# Patient Record
Sex: FEMALE | Race: WHITE | NOT HISPANIC OR LATINO | Employment: UNEMPLOYED | URBAN - METROPOLITAN AREA
[De-identification: names, ages, dates, MRNs, and addresses within clinical notes are randomized per-mention and may not be internally consistent; named-entity substitution may affect disease eponyms.]

---

## 2022-08-11 ENCOUNTER — TELEPHONE (OUTPATIENT)
Dept: NEUROLOGY | Facility: CLINIC | Age: 52
End: 2022-08-11
Payer: OTHER GOVERNMENT

## 2022-08-11 ENCOUNTER — TELEPHONE (OUTPATIENT)
Dept: GASTROENTEROLOGY | Facility: CLINIC | Age: 52
End: 2022-08-11
Payer: OTHER GOVERNMENT

## 2022-08-11 ENCOUNTER — TELEPHONE (OUTPATIENT)
Dept: NEUROSURGERY | Facility: CLINIC | Age: 52
End: 2022-08-11
Payer: OTHER GOVERNMENT

## 2022-08-11 NOTE — TELEPHONE ENCOUNTER
----- Message from Brenda Reid sent at 8/11/2022  2:06 PM CDT -----  Contact: self  Type: Sooner Appointment Request        Caller is requesting a sooner appointment. Caller declined first available appointment listed below. Caller will not accept being placed on the waitlist and is requesting a message be sent to doctor.        Name of Caller: patient  When is the first available appointment? N/a  Best Call Back Number: [phone] (home)   Additional Information: Pt called today has a referral from Dr. Hoffman and wants to know can she be seen seen to get her colonoscopy. Plz call pt back to get scheduled. Thanks.

## 2022-08-11 NOTE — TELEPHONE ENCOUNTER
Called patient to make her aware that Malcom Bar doesn't see this type of diagnosis.  The patient v/u and stated she's being pointed in the right direction and she's being scheduled to see Dr. Thayer in Neurosurgery.  In basket to Endocrinology was retracted.

## 2022-08-11 NOTE — TELEPHONE ENCOUNTER
Spoke with patient. She is going to bring discs and image reports to be uploaded to get scheduled with [facility]. Dx is pituitary tumor.

## 2022-08-11 NOTE — TELEPHONE ENCOUNTER
After checking with Malcom Bar MA to see if this is something Dr. Snider would see, his MA stated he doesn't see this and it would be Endocrinology.  In basket message sent to Dr. Sandifer, Juliette and NP Qiana Kidd with End[facility]inology Clinic staff to contact patient and schedule appointment.

## 2022-08-12 ENCOUNTER — TELEPHONE (OUTPATIENT)
Dept: GASTROENTEROLOGY | Facility: CLINIC | Age: 52
End: 2022-08-12
Payer: OTHER GOVERNMENT

## 2022-08-17 ENCOUNTER — PATIENT MESSAGE (OUTPATIENT)
Dept: GASTROENTEROLOGY | Facility: CLINIC | Age: 52
End: 2022-08-17
Payer: OTHER GOVERNMENT

## 2022-08-17 ENCOUNTER — TELEPHONE (OUTPATIENT)
Dept: GASTROENTEROLOGY | Facility: CLINIC | Age: 52
End: 2022-08-17
Payer: OTHER GOVERNMENT

## 2022-08-18 ENCOUNTER — PATIENT MESSAGE (OUTPATIENT)
Dept: GASTROENTEROLOGY | Facility: CLINIC | Age: 52
End: 2022-08-18
Payer: OTHER GOVERNMENT

## 2022-11-16 ENCOUNTER — PATIENT MESSAGE (OUTPATIENT)
Dept: GASTROENTEROLOGY | Facility: CLINIC | Age: 52
End: 2022-11-16
Payer: OTHER GOVERNMENT

## 2022-11-23 ENCOUNTER — ANESTHESIA (OUTPATIENT)
Dept: ENDOSCOPY | Facility: HOSPITAL | Age: 52
End: 2022-11-23
Payer: OTHER GOVERNMENT

## 2022-11-23 ENCOUNTER — ANESTHESIA EVENT (OUTPATIENT)
Dept: ENDOSCOPY | Facility: HOSPITAL | Age: 52
End: 2022-11-23
Payer: OTHER GOVERNMENT

## 2022-11-23 ENCOUNTER — HOSPITAL ENCOUNTER (OUTPATIENT)
Facility: HOSPITAL | Age: 52
Discharge: HOME | End: 2022-11-23
Attending: INTERNAL MEDICINE | Admitting: INTERNAL MEDICINE
Payer: OTHER GOVERNMENT

## 2022-11-23 DIAGNOSIS — Z12.11 COLON CANCER SCREENING: ICD-10-CM

## 2022-11-23 PROCEDURE — 63600175 PHARM REV CODE 636 W HCPCS: Mod: PO | Performed by: INTERNAL MEDICINE

## 2022-11-23 PROCEDURE — G0121 COLON CA SCRN NOT HI RSK IND: ICD-10-PCS | Mod: ,,, | Performed by: INTERNAL MEDICINE

## 2022-11-23 PROCEDURE — D9220A PRA ANESTHESIA: Mod: ANES,,, | Performed by: ANESTHESIOLOGY

## 2022-11-23 PROCEDURE — G0121 COLON CA SCRN NOT HI RSK IND: HCPCS | Mod: ,,, | Performed by: INTERNAL MEDICINE

## 2022-11-23 PROCEDURE — D9220A PRA ANESTHESIA: Mod: CRNA,,, | Performed by: NURSE ANESTHETIST, CERTIFIED REGISTERED

## 2022-11-23 PROCEDURE — 37000009 HC ANESTHESIA EA ADD 15 MINS: Mod: PO | Performed by: INTERNAL MEDICINE

## 2022-11-23 PROCEDURE — 00812 ANES LWR INTST SCR COLSC: CPT | Mod: PO | Performed by: INTERNAL MEDICINE

## 2022-11-23 PROCEDURE — 25000003 PHARM REV CODE 250: Mod: PO | Performed by: NURSE ANESTHETIST, CERTIFIED REGISTERED

## 2022-11-23 PROCEDURE — D9220A PRA ANESTHESIA: ICD-10-PCS | Mod: ANES,,, | Performed by: ANESTHESIOLOGY

## 2022-11-23 PROCEDURE — G0121 COLON CA SCRN NOT HI RSK IND: HCPCS | Mod: PO | Performed by: INTERNAL MEDICINE

## 2022-11-23 PROCEDURE — 37000008 HC ANESTHESIA 1ST 15 MINUTES: Mod: PO | Performed by: INTERNAL MEDICINE

## 2022-11-23 PROCEDURE — D9220A PRA ANESTHESIA: ICD-10-PCS | Mod: CRNA,,, | Performed by: NURSE ANESTHETIST, CERTIFIED REGISTERED

## 2022-11-23 PROCEDURE — 63600175 PHARM REV CODE 636 W HCPCS: Mod: PO | Performed by: NURSE ANESTHETIST, CERTIFIED REGISTERED

## 2022-11-23 RX ORDER — LIDOCAINE HYDROCHLORIDE 20 MG/ML
INJECTION INTRAVENOUS
Status: DISCONTINUED | OUTPATIENT
Start: 2022-11-23 | End: 2022-11-23

## 2022-11-23 RX ORDER — PROPOFOL 10 MG/ML
VIAL (ML) INTRAVENOUS
Status: DISCONTINUED | OUTPATIENT
Start: 2022-11-23 | End: 2022-11-23

## 2022-11-23 RX ORDER — SODIUM CHLORIDE, SODIUM LACTATE, POTASSIUM CHLORIDE, CALCIUM CHLORIDE 600; 310; 30; 20 MG/100ML; MG/100ML; MG/100ML; MG/100ML
INJECTION, SOLUTION INTRAVENOUS CONTINUOUS
Status: DISCONTINUED | OUTPATIENT
Start: 2022-11-23 | End: 2022-11-23 | Stop reason: HOSPADM

## 2022-11-23 RX ORDER — SODIUM CHLORIDE 0.9 % (FLUSH) 0.9 %
10 SYRINGE (ML) INJECTION
Status: DISCONTINUED | OUTPATIENT
Start: 2022-11-23 | End: 2022-11-23 | Stop reason: HOSPADM

## 2022-11-23 RX ADMIN — PROPOFOL 50 MG: 10 INJECTION, EMULSION INTRAVENOUS at 10:11

## 2022-11-23 RX ADMIN — SODIUM CHLORIDE, SODIUM LACTATE, POTASSIUM CHLORIDE, AND CALCIUM CHLORIDE: .6; .31; .03; .02 INJECTION, SOLUTION INTRAVENOUS at 10:11

## 2022-11-23 RX ADMIN — LIDOCAINE HYDROCHLORIDE 50 MG: 20 INJECTION INTRAVENOUS at 10:11

## 2022-11-23 RX ADMIN — PROPOFOL 100 MG: 10 INJECTION, EMULSION INTRAVENOUS at 10:11

## 2022-11-23 NOTE — PATIENT INSTRUCTIONS
Recovery After Procedural Sedation (Adult)   You have been given medicine by vein to make you sleep during your surgery. This may have included both a pain medicine and sleeping medicine. Most of the effects have worn off. But you may still have some drowsiness for the next 6 to 8 hours.  Home care  Follow these guidelines when you get home:  For the next 8 hours, you should be watched by a responsible adult. This person should make sure your condition is not getting worse.  Don't drink any alcohol for the next 24 hours.  Don't drive, operate dangerous machinery, or make important business or personal decisions during the next 24 hours.  To prevent injury or falls, use caution when standing and walking for at least 24 hours after your procedure.  Note: Your healthcare provider may tell you not to take any medicine by mouth for pain or sleep in the next 4 hours. These medicines may react with the medicines you were given in the hospital. This could cause a much stronger response than usual.  Follow-up care  Follow up with your healthcare provider if you are not alert and back to your usual level of activity within 12 hours.  When to seek medical advice  Call your healthcare provider right away if any of these occur:  Drowsiness gets worse  Weakness or dizziness gets worse  Repeated vomiting  You can't be awakened  Fever  New rash  [employer] last reviewed this educational content on 9/1/2019  © 6965-8841 [employer]. [address] [place] [address], [place] [address]. All rights reserved. This information is not intended as a substitute for professional medical care. Always follow your healthcare professional's instructions           PROBIOTICS:  Now that your colon is so cleaned out, now is a good time for a round of PROBIOTICS.  Eat a container of Greek Yogurt, such as OIKOS or CHOBANI,  Or Activia or Dannon    Greek Yogurt.    Or Take a similar Probiotic product such as Align or Culturelle or Yanelis-Q, every day  for a month.                  (The products listed are non-prescription, but you may need to ask the pharmacist for their location.)   Repeat this at least 5-6 times a year.         High-Fiber Diet  Fiber is in fruits, vegetables, cereals, and grains. Fiber passes through your body undigested. A high-fiber diet helps food move through your intestinal tract. The added bulk is helpful in preventing constipation. In people with diverticulosis, fiber helps clean out the pouches along the colon wall. It also prevents new pouches from forming. A high-fiber diet reduces the risk of colon cancer. It also lowers blood cholesterol and prevents high blood sugar in people with diabetes.    The fiber-rich foods listed below should be part of your diet. If you are not used to high-fiber foods, start with 1 or 2 foods from this list. Every 3 to 4 days add a new one to your diet. Do this until you are eating 4 high-fiber foods per day. This should give you 20 to 35 grams of fiber a day. It is also important to drink a lot of water when you are on this diet. You should have 6 to 8 glasses of water a day. Water makes the fiber swell and increases the benefit.  Foods high in dietary fiber  The following foods are high in dietary fiber:  Breads. Breads made with 100% whole-wheat flour; colton, wheat, or rye crackers; whole-grain tortillas, bran muffins.  Cereals. Whole-grain and bran cereals with bran (shredded wheat, wheat flakes, raisin bran, corn bran); oatmeal, rolled oats, granola, and brown rice.  Fruits. Fresh fruits and their edible skins (pears, prunes, raisins, berries, apples, and apricots); bananas, citrus fruit, mangoes, pineapple; and prune juice.  Nuts. Any nuts and seeds.  Vegetables. Best served raw or lightly cooked. All types, especially: green peas, celery, eggplant, potatoes, spinach, broccoli, Shaw[place] sprouts, winter squash, carrots, cauliflower, soybeans, lentils, and fresh and dried beans of all kinds.  Other.  Popcorn, any spices.  Date Last Reviewed: 8/1/2016  © 3517-4827 [employer], [employer]. [address] [place] [address], [place] [address]. All rights reserved. This information is not intended as a substitute for professional medical care. Always follow your healthcare professional's instructions.

## 2022-11-23 NOTE — BRIEF OP NOTE
Discharge Note  Short Stay      SUMMARY     Admit Date: 11/23/2022    Attending Physician: Don Frances Jr., MD     Discharge Physician: Don Frances Jr., MD    Discharge Date: 11/23/2022 11:16 AM    Final Diagnosis: Screening for colon cancer [Z12.11]    Impression:            - Redundant colon.                          - Tortuous colon.                          - Non-bleeding internal hemorrhoids.                          - The examination was otherwise normal.                          - The examined portion of the ileum was normal.                          - No specimens collected.   Recommendation:        - Discharge patient to home.                          - High fiber diet.                          - Use fiber, for example Citrucel, Fibercon,                          Konsyl or Metamucil.                          - Take a PROBIOTIC, such as a carton of GREEK                          YOGURT (Chobani or Oikos, or Activia or Dannon);                          or tablets of ALIGN or CULTURELLE or CHARAN-Q (all                          non-prescription), every day for a month.                          - Repeat colonoscopy in 8-10 years for screening                          purposes.                          - Continue present medications.                          - Patient has a contact number available for                          emergencies. The signs and symptoms of potential                          delayed complications were discussed with the                          patient. Return to normal activities tomorrow.                          Written discharge instructions were provided to                          the patient.                          - Return to normal activities tomorrow.   Don Frances MD   11/23/2022       Disposition: HOME OR SELF CARE    Patient Instructions:   Current Discharge Medication List        CONTINUE these medications which have NOT CHANGED    Details   aspirin  (ECOTRIN) 81 MG EC tablet Take 1 tablet by mouth once daily.      cholecalciferol, vitamin D3, (VITAMIN D3 ORAL) Take by mouth.      CMPD testosterone proprionate 2% in vanicream Apply topically. Apply 1 gram as directed 2-3 times weekly.      DILT- mg CDCR Take 180 mg by mouth once daily.      estradioL (ELESTRIN) 0.87 gram/actuation GlPm       fluticasone propion-salmeterol 115-21 mcg/dose (ADVAIR HFA) 115-21 mcg/actuation HFAA inhaler Inhale 2 puffs into the lungs every 12 (twelve) hours.  Qty: 36 g, Refills: 3    Associated Diagnoses: Post-viral cough syndrome      losartan (COZAAR) 100 MG tablet Take 100 mg by mouth once daily. Take 50mg am, 50mg pm      NP THYROID 15 mg Tab Take 15 mg by mouth every morning.      progesterone (PROMETRIUM) 100 MG capsule Take 100 mg by mouth once daily.             Discharge Procedure Orders (must include Diet, Follow-up, Activity)    Follow Up:  Follow up with PCP as per your routine.  Please follow a high fiber diet.  Activity as tolerated.    No driving day of procedure.    PROBIOTICS:  Now that your colon is so cleaned out, now is a good time for a round of PROBIOTICS.  Eat a container of Greek Yogurt, such as OIKOS or CHOBANI,  Or Activia or Dannon    Greek Yogurt.    Or Take a similar Probiotic product such as Align or Culturelle or Yanelis-Q, every day for a month.                  (The products listed are non-prescription, but you may need to ask the pharmacist for their location.)   Repeat this at least 5-6 times a year.

## 2022-11-23 NOTE — H&P
History & Physical - Short Stay  Gastroenterology      SUBJECTIVE:     Procedure: Colonoscopy    Chief Complaint/Indication for Procedure: Screening, FHx of colon cancer (Maternal GM)    History of Present Illness:  Asymptomatic    See recent PCP's OV note:  [facility]   [facility] Encounter to establish care with new doctor +6 more  Dx Establish Care  Annual Exam  Chest Pain   Hip Pain   Hypertension   Pituitary Tumor   Reason for Visit     Progress Notes    Alta Hoffman DO at 8/11/2022  9:30 AM    Status: Signed   Expand All Collapse All   Patient ID: Karolina Medina is a 52 y.o. female.     Chief Complaint: Establish Care,  Chest Pain (ER eval 3 weeks ago, dx pleurisy, continues to have left chest pain, exacerbated by cough), Hip Pain (Right hip pain), Hypertension (Pulse has been running low at rest; H/O of TIA 2020 - would like referral to Dr. Choi), and Pituitary Tumor (No MRI or neuro follow up in 7 years)           HPI  Karolina Medina is a 52 y.o. female  presents to clinic to est care and for multiple complaints.     TIA in 2020 - currently on estrogen, testosterone and progesterone by previous GYN.       HTN- on carvedilol and losartan, BP is high in clinic, it is usually well controlled at home, however she has not been checking her BP lately, she endorses increased stress as she is now living her her mother in law and her aunt.      Pituitary tumor diagnosed in 2000, has been just monitoring it.  She was last seen by neurology seven years ago.  She has a spot in her vision- saw her daughter, Dr. Smithew, and she thought that it was something in the right eye.      HRT - on estrogen and testosterone, has h/o TIA- needs to est with GYN to further discuss this.     Pleurisy- seen at [facility] ER 7/15/22, after she had COVID.  Still some pain when coughing, s/p steroids.  Wants pulm referral.       R hip pain - worse when flexed.  Has tried PT in the pas, which initially helped and then pain returns.       Colon cancer Maternal Grandmother                Health Maintenance   Topic Date Due    Hepatitis C Screening  Never done    Lipid Panel  Never done    Mammogram  Never done    TETANUS VACCINE  06/22/2016   Colonoscopy- never, due, will refer to Dr. Frances  Mammo- just completed by previous GYN  [facility] Exam/PAP- due for PAP, will be est care with GYN- does not need referral     Tobacco use: Non-smoker  EtOH use: 8-10 glasses wine weekly  Diet and Exercise:  Reviewed the importance of a heart healthy diet and 30-60 minutes of exercise daily.         Assessment:  1. Encounter for screening for cervical cancer    2. Encounter to establish care with new doctor    3. History of transient ischemic attack (TIA)    4. Pituitary tumor    5. Primary hypertension    6. Screening for colon cancer    7. Pleurisy    8. Chronic right hip pain       Plan:     Encounter to establish care with new doctor     History of transient ischemic attack (TIA)  Will check cholesterol and manage BP  Refer to neuro     Pituitary tumor  Has not seen neuro in > 7 years, now with visual changes  Will refer to see neuro  -     Ambulatory referral/consult to Neurology; Future; Expected date: 08/18/2022     Primary hypertension  Continue BB and ARB  Follow heart healthy diet   Exercise 30-60 minutes daily  Pt to keep blood pressure log checking twice daily- once one hour after taking medication  and then anytime in the afternoon or evening.  Pt to call if blood pressure is consistently greater than 140/90 OR if blood pressure is less than 100/60.  Pt to bring log to next clinic visit with home blood pressure cuff to assess accuracy of home readings.   -     Ambulatory referral/consult to Cardiology; Future; Expected date: 08/18/2022     Screening for colon cancer  -     Ambulatory referral/consult to Gastroenterology; Future; Expected date: 08/18/2022     Pleurisy  Pt to start  Flonase nasal spray- one spray in each nostril twice daily  Start daily allergy medication such as zyrtec  Declines cough medications at this time.  -     Ambulatory referral/consult to Pulmonology; Future; Expected date: 08/18/2022     Chronic right hip pain  Consider PT referral  Xray of hips  -     X-Ray Hips Bilateral 2 View Incl AP Pelvis; Future; Expected date: 08/11/2022        Pt amenable to above plan  F/U in 1 month for wellness exam or sooner if needed           PTA Medications   Medication Sig    aspirin (ECOTRIN) 81 MG EC tablet Take 1 tablet by mouth once daily.    cholecalciferol, vitamin D3, (VITAMIN D3 ORAL) Take by mouth.    CMPD testosterone proprionate 2% in vanicream Apply topically. Apply 1 gram as directed 2-3 times weekly.    DILT- mg CDCR Take 180 mg by mouth once daily.    estradioL (ELESTRIN) 0.87 gram/actuation GlPm     fluticasone propion-salmeterol 115-21 mcg/dose (ADVAIR HFA) 115-21 mcg/actuation HFAA inhaler Inhale 2 puffs into the lungs every 12 (twelve) hours.    losartan (COZAAR) 100 MG tablet Take 100 mg by mouth once daily. Take 50mg am, 50mg pm    NP THYROID 15 mg Tab Take 15 mg by mouth every morning.    progesterone (PROMETRIUM) 100 MG capsule Take 100 mg by mouth once daily.       Review of patient's allergies indicates:   Allergen Reactions    Amlodipine Rash and Swelling        Past Medical History:   Diagnosis Date    Hypertension     Pituitary tumor 2000    TIA (transient ischemic attack) 02/2020     Past Surgical History:   Procedure Laterality Date    AUGMENTATION OF BREAST  1993    1st set / replaced 2020    HYSTERECTOMY  2008    Partial     Family History   Problem Relation Age of Onset    Diabetes Mother     Hypothyroidism Mother     Hypertension Mother     Hypertension Father     Stroke Father     Esophageal cancer Father     No Known Problems Brother     Colon cancer Maternal Grandmother     Hypertension Maternal Grandmother     Diabetes Maternal Grandmother   "   Alzheimer's disease Paternal Grandmother     Stomach cancer Paternal Grandfather     Lung cancer Paternal Grandfather      Social History     Tobacco Use    Smoking status: Never    Smokeless tobacco: Never   Substance Use Topics    Alcohol use: Yes     Alcohol/week: 8.0 - 10.0 standard drinks     Types: 8 - 10 Glasses of wine per week    Drug use: Never         OBJECTIVE:     Vital Signs (Most Recent)  Temp: 97.1 °F (36.2 °C) (11/23/22 1010)  Pulse: 67 (11/23/22 1010)  Resp: 18 (11/23/22 1010)  BP: 134/88 (11/23/22 1010)  SpO2: 99 % (11/23/22 1010)    Physical Exam:  : Ht: 5' 7" (170.2 cm)   Wt: 62.1 kg (137 lb)   BMI: 21.46 kg/m²    .                                                   GENERAL:  Comfortable, in no acute distress.                                 HEENT EXAM:  Nonicteric.  No adenopathy.  Oropharynx is clear.               NECK:  Supple.                                                               LUNGS:  Clear.                                                               CARDIAC:  Regular rate and rhythm.  S1, S2.  No murmur.                      ABDOMEN:  Soft, positive bowel sounds, nontender.  No hepatosplenomegaly or masses.  No rebound or guarding.                                             EXTREMITIES:  No edema.     MENTAL STATUS:  Alert and oriented.    ASSESSMENT/PLAN:     Assessment: Screening, FHx of colon cancer (Maternal GM)    Plan: Colonoscopy    Anesthesia Plan:   MAC / General Anaesthesia    ASA Grade: ASA 2 - Patient with mild systemic disease with no functional limitations    MALLAMPATI SCORE: I (soft palate, uvula, fauces, and tonsillar pillars visible)    "

## 2022-11-23 NOTE — [employer] PATIENT INSTRUCTIONS
Discharge Summary/Instructions for after Colonoscopy without   Biopsy/Polypectomy  Karolina Medina    Wednesday, November 23, 2022  Don Frances MD  RESTRICTIONS ON ACTIVITY:  - Do not drive a car or operate machinery until the day after the procedure.      - The following day: return to full activity including work.  - For  3 days: No heavy lifting, straining or running.  - Diet: You may eat solid foods, but no gassy foods (i.e. beans, broccoli,   cabbage, etc).  TREATMENT FOR COMMON SIDE EFFECTS:  - Mild abdominal pain and bloating or excessive gas: rest, eat lightly and   use a heating pad.  SYMPTOMS TO WATCH FOR AND REPORT TO YOUR PHYSICIAN:  1. Severe abdominal pain.  2. Fever within 24 hours after a procedure.  3. A large amount of rectal bleeding. (A small amount of blood from the   rectum is not serious, especially if hemorrhoids are present.  3.  Because air was put into your colon during the procedure, expelling   large amounts of air from your rectum is normal.  4.  You may not have a bowel movement for 1-3 days because of the   colonoscopy prep.  This is normal.  5.  Call immediately if you notice any of the following:   Chills and/or fever over 101   Persistent vomiting   Severe abdominal pain, other than gas cramps   Severe chest pain   Black, tarry stools   Any bleeding - exceeding one tablespoon  Your doctor recommends these additional instructions:  You are being discharged to home.   Eat a high fiber diet.   Take a fiber supplement, for example Citrucel, Fibercon, Konsyl or   Metamucil.   Take a PROBIOTIC, such as a carton of GREEK YOGURT (Chobani or Oikos,  or   Activia or Dannon);  or tablets of ALIGN or CULTURELLE or CHARAN-Q (all   non-prescription), every day for a month.   And repeat this at least 4-5   times a year.  Your physician has recommended a repeat colonoscopy in 8-10 years for   screening purposes (age 60?).  None  If you have any questions or problems, please call your  physician.  EMERGENCY PHONE NUMBER: [phone]  LAB RESULTS: Call in two (2) weeks for lab results, [phone]  ___________________________________________  Nurse Signature  ___________________________________________  Patient/Designated Responsible Party Signature  Don Frances MD  11/23/2022 11:14:35 AM  This report has been verified and signed electronically.  Dear patient,  As a result of recent federal legislation (The Federal Cures Act), you may   receive lab or pathology results from your procedure in your [facility]   account before your physician is able to contact you. Your physician or   their representative will relay the results to you with their   recommendations at their soonest availability.  Thank you.  [employer]

## 2022-11-23 NOTE — ANESTHESIA PREPROCEDURE EVALUATION
11/23/2022  Karolina Medina is a 52 y.o., female.      Pre-op Assessment    I have reviewed the Patient Summary Reports.     I have reviewed the Nursing Notes.       Review of Systems  Anesthesia Hx:  No problems with previous Anesthesia    Cardiovascular:   Hypertension    Neurological:   TIA,        Physical Exam  General: Well nourished        Anesthesia Plan  Type of Anesthesia, risks & benefits discussed:    Anesthesia Type: Gen Natural Airway  Intra-op Monitoring Plan: Standard ASA Monitors  Induction:  IV  Informed Consent: Informed consent signed with the Patient and all parties understand the risks and agree with anesthesia plan.  All questions answered.   ASA Score: 2  Day of Surgery Review of History & Physical: H&P Update referred to the surgeon/provider.    Ready For Surgery From Anesthesia Perspective.     .

## 2022-11-23 NOTE — TRANSFER OF CARE
"Anesthesia Transfer of Care Note    Patient: Karolina Medina    Procedure(s) Performed: Procedure(s) (LRB):  COLONOSCOPY (N/A)    Patient location: PACU    Anesthesia Type: general    Transport from OR: Transported from OR on room air with adequate spontaneous ventilation    Post pain: adequate analgesia    Post assessment: no apparent anesthetic complications and tolerated procedure well    Post vital signs: stable    Level of consciousness: sedated    Nausea/Vomiting: no nausea/vomiting    Complications: none    Transfer of care protocol was followed      Last vitals:   Visit Vitals  /88 (BP Location: Right arm, Patient Position: Lying)   Pulse 67   Temp 36.2 °C (97.1 °F) (Skin)   Resp 18   Ht 5' 7" (1.702 m)   Wt 59.9 kg (132 lb)   SpO2 99%   Breastfeeding No   BMI 20.67 kg/m²     "

## 2022-11-25 VITALS
WEIGHT: 132 LBS | SYSTOLIC BLOOD PRESSURE: 114 MMHG | TEMPERATURE: 97 F | OXYGEN SATURATION: 100 % | HEART RATE: 61 BPM | BODY MASS INDEX: 20.72 KG/M2 | HEIGHT: 67 IN | RESPIRATION RATE: 16 BRPM | DIASTOLIC BLOOD PRESSURE: 58 MMHG

## 2022-11-28 NOTE — ANESTHESIA POSTPROCEDURE EVALUATION
Anesthesia Post Evaluation    Patient: Karolina Medina    Procedure(s) Performed: Procedure(s) (LRB):  COLONOSCOPY (N/A)    Final Anesthesia Type: general      Patient location during evaluation: PACU  Patient participation: Yes- Able to Participate  Level of consciousness: awake and alert  Post-procedure vital signs: reviewed and stable  Pain management: adequate  Airway patency: patent    PONV status at discharge: No PONV  Anesthetic complications: no      Cardiovascular status: blood pressure returned to baseline  Respiratory status: unassisted and room air  Hydration status: euvolemic  Follow-up not needed.          Vitals Value Taken Time   /58 11/23/22 1113   Temp  11/28/22 1409   Pulse 61 11/23/22 1113   Resp 16 11/23/22 1113   SpO2 100 % 11/23/22 1113         Event Time   Out of Recovery 11:30:46         Pain/Carlos Score: No data recorded

## 2025-05-21 ENCOUNTER — OFFICE VISIT (OUTPATIENT)
Dept: OTOLARYNGOLOGY | Facility: CLINIC | Age: 55
End: 2025-05-21
Payer: OTHER GOVERNMENT

## 2025-05-21 VITALS — WEIGHT: 137.13 LBS | HEIGHT: 67 IN | BODY MASS INDEX: 21.52 KG/M2

## 2025-05-21 DIAGNOSIS — J34.2 DNS (DEVIATED NASAL SEPTUM): ICD-10-CM

## 2025-05-21 DIAGNOSIS — J34.89 CONCHA BULLOSA: ICD-10-CM

## 2025-05-21 DIAGNOSIS — R09.82 PND (POST-NASAL DRIP): Primary | ICD-10-CM

## 2025-05-21 DIAGNOSIS — J45.30 MILD PERSISTENT ASTHMA WITHOUT COMPLICATION: ICD-10-CM

## 2025-05-21 DIAGNOSIS — R09.A2 GLOBUS SYNDROME: ICD-10-CM

## 2025-05-21 PROCEDURE — 92511 NASOPHARYNGOSCOPY: CPT | Mod: PBBFAC,PO | Performed by: STUDENT IN AN ORGANIZED HEALTH CARE EDUCATION/TRAINING PROGRAM

## 2025-05-21 PROCEDURE — 92511 NASOPHARYNGOSCOPY: CPT | Mod: S$PBB,,, | Performed by: STUDENT IN AN ORGANIZED HEALTH CARE EDUCATION/TRAINING PROGRAM

## 2025-05-21 PROCEDURE — 99999 PR PBB SHADOW E&M-EST. PATIENT-LVL IV: CPT | Mod: PBBFAC,,, | Performed by: STUDENT IN AN ORGANIZED HEALTH CARE EDUCATION/TRAINING PROGRAM

## 2025-05-21 PROCEDURE — 99214 OFFICE O/P EST MOD 30 MIN: CPT | Mod: PBBFAC,PO | Performed by: STUDENT IN AN ORGANIZED HEALTH CARE EDUCATION/TRAINING PROGRAM

## 2025-05-21 PROCEDURE — 99204 OFFICE O/P NEW MOD 45 MIN: CPT | Mod: 25,S$PBB,, | Performed by: STUDENT IN AN ORGANIZED HEALTH CARE EDUCATION/TRAINING PROGRAM

## 2025-05-21 RX ORDER — LEVOCETIRIZINE DIHYDROCHLORIDE 5 MG/1
5 TABLET, FILM COATED ORAL NIGHTLY
Qty: 90 TABLET | Refills: 3 | Status: SHIPPED | OUTPATIENT
Start: 2025-05-21 | End: 2026-05-21

## 2025-05-21 RX ORDER — CLOPIDOGREL 75 MG/1
TABLET, FILM COATED ORAL
COMMUNITY
Start: 2025-04-23

## 2025-05-21 RX ORDER — APIXABAN 5 MG/1
5 TABLET, FILM COATED ORAL
COMMUNITY
Start: 2025-05-20 | End: 2026-05-20

## 2025-05-21 RX ORDER — FAMOTIDINE 40 MG/1
40 TABLET, FILM COATED ORAL NIGHTLY
Qty: 90 TABLET | Refills: 3 | Status: SHIPPED | OUTPATIENT
Start: 2025-05-21 | End: 2026-05-21

## 2025-05-21 NOTE — PROGRESS NOTES
Otolaryngology Clinic Note    Subjective:       Patient ID: Karolina Medina is a 54 y.o. female.    Chief Complaint: Sinus Problem (PND)      History of Present Illness: Karolina Medina is a 54 y.o. female presenting with rough spring this year. Seeing Dr. De Dios for 3 years. On lots of meds.   Constant PND, crud in throat. Denies sinus pressure, congestion, feels like something is on left side that won't clear in nose.   Does try rinses and likes. Flonase, astelin BID, atrovent BID, singulair, loratidine daily.   She was allergy tested in march and was negative.   No reflux issues. Throat clears all the time. When she uses albuterol, helps move it.   Getting better past couple of weeks, further from spring. Will happen in fall as well.   No regular sinus infections. Does get respiratory infections needing doxy and pred. Has asthma dx, moved back 3 years ago. No allergy to aspirin.       Past Surgical History:   Procedure Laterality Date    AUGMENTATION OF BREAST  1993    1st set / replaced 2020    COLONOSCOPY N/A 11/23/2022    Procedure: COLONOSCOPY;  Surgeon: Don Frances Jr., MD;  Location: [facility];  Service: Endoscopy;  Laterality: N/A;    HYSTERECTOMY  2008    Partial     Past Medical History:   Diagnosis Date    Hypertension     Pituitary tumor 2000    TIA (transient ischemic attack) 02/2020     Social Drivers of Health     Tobacco Use: Low Risk  (5/19/2025)    Received from [facility]    Patient History     Smoking Tobacco Use: Never     Smokeless Tobacco Use: Never     Passive Exposure: Never   Alcohol Use: Alcohol Misuse (5/14/2025)    AUDIT-C     Frequency of Alcohol Consumption: 2-3 times a week     Average Number of Drinks: 3 or 4     Frequency of Binge Drinking: Less than monthly   Financial Resource Strain: Low Risk  (5/14/2025)    Overall Financial Resource Strain (CARDIA)     Difficulty of Paying Living Expenses: Not hard at all   Food Insecurity: No Food Insecurity (5/14/2025)    Hunger Vital  Sign     Worried About Running Out of Food in the Last Year: Never true     Ran Out of Food in the Last Year: Never true   Transportation Needs: No Transportation Needs (5/14/2025)    PRAPARE - Transportation     Lack of Transportation (Medical): No     Lack of Transportation (Non-Medical): No   Physical Activity: Sufficiently Active (5/14/2025)    Exercise Vital Sign     Days of Exercise per Week: 5 days     Minutes of Exercise per Session: 30 min   Stress: No Stress Concern Present (5/14/2025)    [employer] [place] [employer] - Occupational Stress Questionnaire     Feeling of Stress : Only a little   Housing Stability: Low Risk  (5/14/2025)    Housing Stability Vital Sign     Unable to Pay for Housing in the Last Year: No     Number of Times Moved in the Last Year: 0     Homeless in the Last Year: No   Depression: Not at risk (3/9/2024)    Received from [facility]    PHQ-2     Patient Health Questionnaire-2 Score: 0   Utilities: Not At Risk (5/14/2025)    [facility] Utilities     Threatened with loss of utilities: No   Health Literacy: Adequate Health Literacy (5/14/2025)     Health Literacy     Frequency of need for help with medical instructions: Never   Social Isolation: Not on file     Review of patient's allergies indicates:   Allergen Reactions    Amlodipine Rash, Swelling, Itching and Other (See Comments)    Sulfa (sulfonamide antibiotics) Other (See Comments)     Other reaction(s): Unknown    DIZZINESS     Current Outpatient Medications   Medication Instructions    albuterol (PROVENTIL/VENTOLIN HFA) 90 mcg/actuation inhaler USE 1 TO 2 INHALATIONS EVERY 6 HOURS AS NEEDED FOR WHEEZING OR SHORTNESS OF BREATH (CHEST TIGHTNESS) RESCUE    azelastine (ASTELIN) 274 mcg, Nasal, 2 times daily    cholecalciferol (vitamin D3) (VITAMIN D3) 2,000 Units, Daily    CMPD testosterone proprionate 2% in vanicream Apply topically. Apply 1 gram as directed 2-3 times weekly.    CYTOMEL 5 mcg, Daily    DILT- mg,  Daily    ELIQUIS 5 mg    estradioL (DIVIGEL) 1.25 mg    famotidine (PEPCID) 40 mg, Oral, Nightly    fluticasone propion-salmeterol 115-21 mcg/dose (ADVAIR HFA) 115-21 mcg/actuation HFAA inhaler 2 puffs, Inhalation, Every 12 hours, Controller    fluticasone propionate (FLONASE ALLERGY RELIEF NASL)     ipratropium (ATROVENT) 21 mcg (0.03 %) nasal spray USE 2 SPRAYS IN EACH NOSTRIL TWICE A DAY AS NEEDED FOR RHINITIS    levocetirizine (XYZAL) 5 mg, Oral, Nightly    losartan (COZAAR) 50 mg, Daily    montelukast (SINGULAIR) 10 mg, Oral, Nightly    NP THYROID 30 mg, Before breakfast    PLAVIX 75 mg tablet     progesterone (PROMETRIUM) 100 mg, Daily    thyroid, pork, (NP THYROID) 15 mg Tab 1 tablet, Before breakfast    vitamin K2 100 mcg         ENT ROS negative except as stated above.     Patient answers are not available for this visit.            Objective:      There were no vitals filed for this visit.    General: NAD, well appearing  Eyes: Normal conjunctiva and lids  Face: symmetric, nerve intact  Nose: The nose is without any evidence of any deformity. The nasal mucosa is moist. The septum is midline. There is no evidence of septal hematoma. The turbinates are without abnormality.   Ears: The ears are with normal-appearing pinna. Examination of the canals is normal appearing bilaterally. There is no drainage or erythema noted. The tympanic membranes are normal appearing with pearly color, normal-appearing landmarks and normal light reflex. Hearing is grossly intact.  Mouth: No obvious abnormalities to the lips. The teeth are unremarkable. The gingivae are without any obvious evidence of infection or lesion. The oral mucosa is moist and pink. There are no obvious masses to the hard or soft palate.   Oropharynx: The uvula is midline.  The tongue is midline. The posterior pharynx is without erythema or exudate. The tonsils are normal appearing.  Salivary glands: The salivary glands are symmetric and not enlarged, no  masses  Neck: No lymphadenopathy, trachea midline, thryoid not enlarged.  Psych: Normal mood and affect.   Neuro: Grossly intact  Speech: fluent    Procedure: Flexible nasopharyngoscopy  Indications: PND, globus  Verbal consent obtained  Anesthesia: lidocaine and phenylephrine nasal spray  Procedure: Scope was passed into right or left nare, to nasopharynx and down to visualize the glottis. Findings below. Patient tolerated procedure well.     - Nose septal deviation severe to right, turb hypertrophy, ronnie on left middle turbinate, meatuses mildly congested but otherwise ok  - Nasopharynx- WNL, no masses, adenoids small  - Oropharynx- BOT symmetric, no masses  - Hypopharynx and piriform sinuses- no masses on trumpet maneuver, post cricoid edema noted  - Supraglottis- Arytenoids intact, no masses, not edematous or erythematous  - Glottis- Bilateral vocal folds intact with full motion, no masses  - Glottic closure- complete      Test Review: I personally reviewed Mri 2024- DNS, ronnie bullosa left       Assessment and Plan:       1. PND (post-nasal drip)    2. Mild persistent asthma without complication    3. Globus syndrome    4. DNS (deviated nasal septum)    5. Ronnie bullosa          Not sure if  would cover rhinaer. Has severe DNS and ronnie on left. Would possibly help to open nose more but not her main complaint. Will try medications in spring and fall and see how it goes.     Could try budesonide rinses spring and fall and see what this does. Will try below before going to this. Did review.     Continue rinses, nasacort and astelin BID, atrovent TID, singulair, will try xyzal instead of loratidine. Add pepcid when irritated for double anithistamine coverage and silent reflux coverage.     RTC: contact in fall if budesonide needed or if we need to discuss surgery.     Plan of care was discussed in detail with the patient, who agreed with the plan as above. All questions were answered in detail.      Darcie Brower MD  Otolaryngology